# Patient Record
Sex: MALE | Race: WHITE | Employment: FULL TIME | URBAN - METROPOLITAN AREA
[De-identification: names, ages, dates, MRNs, and addresses within clinical notes are randomized per-mention and may not be internally consistent; named-entity substitution may affect disease eponyms.]

---

## 2022-04-08 ENCOUNTER — APPOINTMENT (OUTPATIENT)
Dept: CT IMAGING | Age: 57
End: 2022-04-08
Payer: COMMERCIAL

## 2022-04-08 ENCOUNTER — HOSPITAL ENCOUNTER (EMERGENCY)
Age: 57
Discharge: HOME | End: 2022-04-09
Attending: EMERGENCY MEDICINE
Payer: COMMERCIAL

## 2022-04-08 ENCOUNTER — APPOINTMENT (OUTPATIENT)
Dept: GENERAL RADIOLOGY | Age: 57
End: 2022-04-08
Payer: COMMERCIAL

## 2022-04-08 VITALS
SYSTOLIC BLOOD PRESSURE: 135 MMHG | DIASTOLIC BLOOD PRESSURE: 104 MMHG | RESPIRATION RATE: 24 BRPM | TEMPERATURE: 98.9 F | OXYGEN SATURATION: 94 % | HEART RATE: 75 BPM

## 2022-04-08 DIAGNOSIS — S02.0XXA CLOSED FRACTURE OF FRONTAL BONE, INITIAL ENCOUNTER (HCC): Primary | ICD-10-CM

## 2022-04-08 DIAGNOSIS — S52.121A DISPLACED FRACTURE OF HEAD OF RIGHT RADIUS, INITIAL ENCOUNTER FOR CLOSED FRACTURE: ICD-10-CM

## 2022-04-08 PROBLEM — S53.004A: Status: ACTIVE | Noted: 2022-04-08

## 2022-04-08 PROBLEM — S02.129A ORBITAL ROOF FRACTURE (HCC): Status: ACTIVE | Noted: 2022-04-08

## 2022-04-08 PROCEDURE — 73070 X-RAY EXAM OF ELBOW: CPT

## 2022-04-08 PROCEDURE — 73590 X-RAY EXAM OF LOWER LEG: CPT

## 2022-04-08 PROCEDURE — 12011 RPR F/E/E/N/L/M 2.5 CM/<: CPT

## 2022-04-08 PROCEDURE — 71045 X-RAY EXAM CHEST 1 VIEW: CPT

## 2022-04-08 PROCEDURE — 70486 CT MAXILLOFACIAL W/O DYE: CPT

## 2022-04-08 PROCEDURE — 6360000002 HC RX W HCPCS: Performed by: STUDENT IN AN ORGANIZED HEALTH CARE EDUCATION/TRAINING PROGRAM

## 2022-04-08 PROCEDURE — 72125 CT NECK SPINE W/O DYE: CPT

## 2022-04-08 PROCEDURE — 70450 CT HEAD/BRAIN W/O DYE: CPT

## 2022-04-08 PROCEDURE — 73562 X-RAY EXAM OF KNEE 3: CPT

## 2022-04-08 PROCEDURE — 99285 EMERGENCY DEPT VISIT HI MDM: CPT

## 2022-04-08 PROCEDURE — 90471 IMMUNIZATION ADMIN: CPT | Performed by: STUDENT IN AN ORGANIZED HEALTH CARE EDUCATION/TRAINING PROGRAM

## 2022-04-08 PROCEDURE — 90715 TDAP VACCINE 7 YRS/> IM: CPT | Performed by: STUDENT IN AN ORGANIZED HEALTH CARE EDUCATION/TRAINING PROGRAM

## 2022-04-08 PROCEDURE — 6370000000 HC RX 637 (ALT 250 FOR IP): Performed by: STUDENT IN AN ORGANIZED HEALTH CARE EDUCATION/TRAINING PROGRAM

## 2022-04-08 RX ORDER — ACETAMINOPHEN 500 MG
1000 TABLET ORAL ONCE
Status: COMPLETED | OUTPATIENT
Start: 2022-04-08 | End: 2022-04-08

## 2022-04-08 RX ADMIN — TETANUS TOXOID, REDUCED DIPHTHERIA TOXOID AND ACELLULAR PERTUSSIS VACCINE, ADSORBED 0.5 ML: 5; 2.5; 8; 8; 2.5 SUSPENSION INTRAMUSCULAR at 20:26

## 2022-04-08 RX ADMIN — ACETAMINOPHEN 1000 MG: 500 TABLET ORAL at 20:26

## 2022-04-08 ASSESSMENT — PAIN SCALES - GENERAL: PAINLEVEL_OUTOF10: 5

## 2022-04-08 NOTE — Clinical Note
Seven Childers was seen and treated in our emergency department on 4/8/2022. He may return to work on 04/18/2022. If you have any questions or concerns, please don't hesitate to call.       Claudette Nye MD

## 2022-04-09 RX ORDER — CEPHALEXIN 500 MG/1
500 CAPSULE ORAL 2 TIMES DAILY
Qty: 14 CAPSULE | Refills: 0 | Status: SHIPPED | OUTPATIENT
Start: 2022-04-09 | End: 2022-04-16

## 2022-04-09 ASSESSMENT — ENCOUNTER SYMPTOMS
SINUS PAIN: 0
SORE THROAT: 0
NAUSEA: 0
DIARRHEA: 0
COUGH: 0
VOMITING: 0
SHORTNESS OF BREATH: 0
ABDOMINAL PAIN: 0
EYE PAIN: 0

## 2022-04-09 NOTE — ED NOTES
Pt switched from c-collar from EMS to [place] collar for comfort.       Breana Sheehan RN  04/08/22 [id number]

## 2022-04-09 NOTE — ED PROVIDER NOTES
[facility] ED  Emergency Department Encounter  EmergencyMedicineResident     This patient was seen during the COVID-19 crisis. There were limited resources and those resources we did have had to be conserved for the sickest of patients. Pt Name: Allen Ames  MRN: [id number]  [place] 1965  Date of evaluation: 4/9/22  PCP: No primary care provider on file. CHIEF COMPLAINT       Chief Complaint   Patient presents with    Fall       HISTORY OF PRESENT ILLNESS  (Location/Symptom, Timing/Onset, Context/Setting, Quality, Duration, Modifying Factors, Severity.)      Allen Ames is a 56 y.o. male who presents for evaluation of fall down a flight of steps at home. Reportedly patient was carrying a cabinet down the steps when he fell forward. He had trauma to the face right elbow and the left knee. He did not lose consciousness. He did feel dazed. Patient denies any neurologic deficits. He notes that he has limited range of motion at the right elbow. He reports minimal comorbidities. He came to emergency department immediately injury. Denies any visual changes or blurred vision. Does wear glasses. Denies any AC or EP use. PAST MEDICAL / SURGICAL /SOCIAL / FAMILY HISTORY      has no past medical history on file. has no past surgical history on file.       Social History     Socioeconomic History    Marital status:      Spouse name: Not on file    Number of children: Not on file    Years of education: Not on file    Highest education level: Not on file   Occupational History    Not on file   Tobacco Use    Smoking status: Not on file    Smokeless tobacco: Not on file   Substance and Sexual Activity    Alcohol use: Not on file    Drug use: Not on file    Sexual activity: Not on file   Other Topics Concern    Not on file   Social History Narrative    Not on file     Social Determinants of Health     Financial Resource Strain:     Difficulty of Paying Living Expenses: Not on file   Food Insecurity:     Worried About [address] [employer] in the Last Year: Not on file    Warner of Food in the Last Year: Not on file   Transportation Needs:     Lack of Transportation (Medical): Not on file    Lack of Transportation (Non-Medical): Not on file   Physical Activity:     Days of Exercise per Week: Not on file    Minutes of Exercise per Session: Not on file   Stress:     Feeling of Stress : Not on file   Social Connections:     Frequency of Communication with Friends and Family: Not on file    Frequency of Social Gatherings with Friends and Family: Not on file    Attends Catholic Services: Not on file    Active Member of [address] [place] [address] or Organizations: Not on file    Attends Club or Organization Meetings: Not on file    Marital Status: Not on file   Intimate Partner Violence:     Fear of Current or Ex-Partner: Not on file    Emotionally Abused: Not on file    Physically Abused: Not on file    Sexually Abused: Not on file   Housing Stability:     Unable to Pay for Housing in the Last Year: Not on file    Number of [place] in the Last Year: Not on file    Unstable Housing in the Last Year: Not on file       History reviewed. No pertinent family history. Allergies:  Patient has no known allergies. Home Medications:  Prior to Admission medications    Medication Sig Start Date End Date Taking? Authorizing Provider   cephALEXin (KEFLEX) 500 MG capsule Take 1 capsule by mouth 2 times daily for 7 days 4/9/22 4/16/22 Yes Jimi Davis MD       REVIEW OF SYSTEMS    (2-9 systems for level 4, 10 or more forlevel 5)      Review of Systems   Constitutional: Negative for activity change, chills and fever. HENT: Negative for congestion, sinus pain and sore throat. Eyes: Negative for pain and visual disturbance. Respiratory: Negative for cough and shortness of breath. Cardiovascular: Negative for chest pain.    Gastrointestinal: Negative for abdominal pain, diarrhea, nausea and vomiting. Genitourinary: Negative for difficulty urinating, dysuria and hematuria. Musculoskeletal:        Right ankle pain, left knee abrasion   Skin: Positive for wound. Neurological: Positive for headaches. Negative for dizziness and light-headedness. Psychiatric/Behavioral: Negative for agitation and confusion. PHYSICAL EXAM   (up to 7 for level 4, 8 or more forlevel 5)      ED TRIAGE VITALS BP: (!) 135/104, Temp: 98.9 °F (37.2 °C), Pulse: 75, Resp: 24, SpO2: 94 %    Vitals:    04/08/22 1950 04/08/22 1957   BP: (!) 135/104    Pulse: 75    Resp: 24    Temp: 98.9 °F (37.2 °C)    TempSrc: Oral    SpO2:  94%         Physical Exam  Constitutional:       General: He is not in acute distress. Appearance: He is ill-appearing. HENT:      Head:      Comments: Marked ecchymosis to both eyes, multiple abrasions over the right side of the forehead and face, jagged laceration over the right eyebrow with active bleeding     Right Ear: Tympanic membrane normal.      Left Ear: Tympanic membrane normal.      Nose: Nose normal. No congestion. Mouth/Throat:      Mouth: Mucous membranes are dry. Comments: No missing teeth, no tongue laceration, chipped lower tooth  Eyes:      Extraocular Movements: Extraocular movements intact. Pupils: Pupils are equal, round, and reactive to light. Cardiovascular:      Rate and Rhythm: Normal rate and regular rhythm. Pulmonary:      Effort: Pulmonary effort is normal. No respiratory distress. Breath sounds: No stridor. No wheezing or rhonchi. Abdominal:      Tenderness: There is no abdominal tenderness. There is no guarding. Musculoskeletal:         General: No swelling or tenderness. Cervical back: Normal range of motion. No rigidity or tenderness. Comments: Tenderness palpation over the radial aspect of the elbow, limited range of motion secondary to pain, distal pulses intact. Abrasion over the left knee. Abrasion to the volar aspect of the left wrist.  No snuffbox tenderness or pain at the wrist.   Skin:     General: Skin is warm and dry. Capillary Refill: Capillary refill takes less than 2 seconds. Comments: Left knee abrasion   Neurological:      General: No focal deficit present. Mental Status: He is alert and oriented to person, place, and time. Cranial Nerves: No cranial nerve deficit. Sensory: No sensory deficit. Motor: No weakness. Coordination: Coordination normal.           DIFFERENTIAL  DIAGNOSIS     PLAN (LABS / IMAGING / EKG):  Orders Placed This Encounter   Procedures    CT HEAD WO CONTRAST    CT CERVICAL SPINE WO CONTRAST    XR CHEST (SINGLE VIEW FRONTAL)    XR ELBOW RIGHT (2 VIEWS)    XR KNEE LEFT (3 VIEWS)    XR TIBIA FIBULA LEFT (2 VIEWS)    CT FACIAL BONES WO CONTRAST    Vitamin D 25 Hydroxy    Inpatient consult to Trauma Surgery    Inpatient consult to Orthopedic Surgery       MEDICATIONS ORDERED:  ED Medication Orders (From admission, onward)    Start Ordered     Status Ordering Provider    04/08/22 2015 04/08/22 2001  Tetanus-Diphth-Acell Pertussis (BOOSTRIX) injection 0.5 mL  ONCE         Last MAR action: Given - by Sujata Fox on 04/08/22 at 2026 Mary VAUGHAN    04/08/22 2015 04/08/22 2001  acetaminophen (TYLENOL) tablet 1,000 mg  ONCE         Last MAR action: Given - by Sujata Fox on 04/08/22 at 2026 JAVI XIONG            DIAGNOSTIC RESULTS / EMERGENCY DEPARTMENT COURSE / MDM     IMPRESSION & INITIAL PLAN:  37-year-old male presenting respiratory for evaluation of a fall down steps carrying a cabinet. He is got marked abrasions over the right side of the face with a laceration over the right eyebrow. Additionally has tenderness over the right elbow with limited range of motion secondary to pain. Patient also has an abrasion over the left knee.   Imaging of all the extremities were obtained, there was a radial head fracture present at the level of the right elbow. It was mildly displaced. Distal pulses and neurovascular sensation are intact. CT head was obtained showing a right frontal skull fracture with extension into the orbital rim. Plastic surgery consult on the case recommending outpatient follow-up and Keflex coverage. Trauma surgery evaluate the patient for both body system involvement, they feel this patient is suitable for follow-up and discharged home as his wife is a nurse. Orthopedic surgery did reduce the fracture bedside he will be following up with Dr. Jane Hollins as an outpatient in 7 to 10 days. Patient did only request Tylenol here in the emergency department for pain control. He was updated with all results. The laceration of the right eyebrow was closed with Dermabond. Patient tolerated the procedure well. The margins were well approximated. There were no complications. LABS:  No results found for this visit on 04/08/22. RADIOLOGY:  CT FACIAL BONES WO CONTRAST   Final Result   Nondisplaced right frontal skull fracture extending through the orbital rim,   orbital roof and into medial orbital wall as described above. No significant   resulting hemorrhage and no pneumocephalus identified. XR CHEST (SINGLE VIEW FRONTAL)   Final Result   No acute airspace disease identified. No displaced fracture identified. XR ELBOW RIGHT (2 VIEWS)   Final Result   Mildly displaced radial head fracture. XR KNEE LEFT (3 VIEWS)   Final Result   No acute osseous abnormality identified in the left knee or lower leg. XR TIBIA FIBULA LEFT (2 VIEWS)   Final Result   No acute osseous abnormality identified in the left knee or lower leg. CT HEAD WO CONTRAST   Final Result   Head CT: Nondisplaced right orbital roof fracture which extends into the   medial orbital wall. Further evaluation with a maxillofacial CT is   recommended to fully characterize that.       Otherwise, no acute intracranial abnormality detected. Cervical spine CT: No acute fracture or traumatic malalignment. CT CERVICAL SPINE WO CONTRAST   Final Result   Head CT: Nondisplaced right orbital roof fracture which extends into the   medial orbital wall. Further evaluation with a maxillofacial CT is   recommended to fully characterize that. Otherwise, no acute intracranial abnormality detected. Cervical spine CT: No acute fracture or traumatic malalignment. CONSULTS:  IP CONSULT TO TRAUMA SURGERY  IP CONSULT TO ORTHOPEDIC SURGERY    CRITICAL CARE:  See attending physician note    FINAL IMPRESSION      1. Closed fracture of frontal bone, initial encounter ([facility] [address]    2. Displaced fracture of head of right radius, initial encounter for closed fracture          DISPOSITION / PLAN     DISPOSITION Decision To Discharge 04/08/2022 11:59:45 PM      PATIENT REFERRED TO:  [address]  [address] [facility]  [phone]  On 4/19/2022  Plastics clinic for orbital fracture    Grisel Craven MD  [address] [facility] [address]  [phone]    In 1 week  radial fx    Kyle Dobson MD  [address] [address]  [phone]    In 1 week        DISCHARGE MEDICATIONS:  Discharge Medication List as of 4/9/2022 12:01 AM      START taking these medications    Details   cephALEXin (KEFLEX) 500 MG capsule Take 1 capsule by mouth 2 times daily for 7 days, Disp-14 capsule, R-0Print           Discharge Medication List as of 4/9/2022 12:01 AM           Lalo Pizano MD  Emergency Medicine Resident    (Please note that portions of this note were completed with a voice recognition program.  Efforts were made to edit the dictations but occasionally words are mis-transcribed.)       Lalo Pizano MD  Resident  04/09/22 1333
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
## 2022-04-09 NOTE — CONSULTS
Orthopaedic Surgery Consult  (Dr. Noah Aguilera)      CC/Reason for consult:  Right radial head fracture    HPI:      The patient is a 56 y.o. male reports to [address] [place] [address] after a fall down 10 flight of stairs. Patient was moving a cabinet into the basement when he tripped over a box and fell down 10 stairs. States that he put his right arm out to brace his fall. He had immediate pain to the elbow and face. EMS was called and the patient was transported to [address] [place] [address]. X-rays in the emergency department of the right elbow demonstrated a radial head fracture. Orthopedics was consulted for further evaluation. At baseline patient states that he has no issues with the right arm. He denies any numbness or tingling to the right upper extremity. Patient was also found to have right orbital fractures. He denies any other pain or complaint at this time    Patient denies any medical history. He denies taking any medications including anticoagulation. He denies any orthopedic history including fracture. Past Medical History:    History reviewed. No pertinent past medical history. Past Surgical History:    History reviewed. No pertinent surgical history. Medications Prior to Admission:   Prior to Admission medications    Not on File     Allergies:    Patient has no known allergies.   Social History:   Social History     Socioeconomic History    Marital status:      Spouse name: None    Number of children: None    Years of education: None    Highest education level: None   Occupational History    None   Tobacco Use    Smoking status: None    Smokeless tobacco: None   Substance and Sexual Activity    Alcohol use: None    Drug use: None    Sexual activity: None   Other Topics Concern    None   Social History Narrative    None     Social Determinants of Health     Financial Resource Strain:     Difficulty of Paying Living Expenses: Not on file   Food Insecurity:     Worried About [facility] in the Last Year: Not on file    Warner of Food in the Last Year: Not on file   Transportation Needs:     Lack of Transportation (Medical): Not on file    Lack of Transportation (Non-Medical): Not on file   Physical Activity:     Days of Exercise per Week: Not on file    Minutes of Exercise per Session: Not on file   Stress:     Feeling of Stress : Not on file   Social Connections:     Frequency of Communication with Friends and Family: Not on file    Frequency of Social Gatherings with Friends and Family: Not on file    Attends Taoist Services: Not on file    Active Member of [address] [place] [address] or Organizations: Not on file    Attends Club or Organization Meetings: Not on file    Marital Status: Not on file   Intimate Partner Violence:     Fear of Current or Ex-Partner: Not on file    Emotionally Abused: Not on file    Physically Abused: Not on file    Sexually Abused: Not on file   Housing Stability:     Unable to Pay for Housing in the Last Year: Not on file    Number of [place] in the Last Year: Not on file    Unstable Housing in the Last Year: Not on file     Family History:  History reviewed. No pertinent family history. ROS:   Constitutional: Negative for fever and chills. Respiratory: Negative for cough. Cardiovascular: Negative for chest pain. Musculoskeletal: Positive for right elbow arthralgias, myalgias. Skin: Negative for itching and rash. Neurological: Negative for numbness, tingling, weakness. PE:  Blood pressure (!) 135/104, pulse 75, temperature 98.9 °F (37.2 °C), temperature source Oral, resp. rate 24, SpO2 94 %. Gen: Alert and oriented, NAD, cooperative. Intubated/sedated. Head: Normocephalic, abrasions to the right side of the face with a laceration over the right eyebrow. Ecchymosis bilateral orbits. .    Cardiovascular: Normal rate. Respiratory: Chest symmetric, no accessory muscle use.     Pelvis: Stable to anterior and lateral compression without pain. RUE: Skin intact. Mild effusion noted with tenderness palpation over the radial head. Non-tender to palpation at the wrist. No abrasion, deformity, or lacerations. Compartments soft and easily compressible. Full ROM at shoulder without pain. Elbow Passive range of motion of 70 degrees of pronation/supination, 10 to 100 degrees flexion/extension with pain at extremes of motion. No mechanical block appreciated. Ulnar/median/AIN/PIN/radial motor intact. Axillary/MCN/median/ulnar/radial nerves SILT. Radial pulse 2+ with BCR.    LUE: Skin intact. No ecchymoses, abrasion, deformity, or lacerations. Non tender to palpation. No crepitus. Compartments soft and easily compressible. Full ROM at shoulder without pain. Full ROM at elbow without pain. Ulnar/median/AIN/PIN/radial motor intact. Axillary/MCN/median/ulnar/radial nerves SILT. Radial pulse 2+ with BCR. RLE: Skin intact. No ecchymoses, abrasions, deformity, or lacerations. Non tender to palpation. No crepitus. Compartments soft and easily compressible. EHL/FHL/TA/GS complex motor intact. Sural/saphenous/SPN/DPN/plantar nerve distribution SILT. Patient has no groin pain with log roll maneuver. Lachman 1A, knee appears stable to varus and valgus stress test at 0 and 30 degrees. DP and PT pulses 2+ with BCR. LLE: Skin intact. Small abrasions to the anterior shin and proximal knee. Non tender to palpation. No crepitus. Compartments soft and easily compressible. EHL/FHL/TA/GS complex motor intact. Sural/saphenous/SPN/DPN/plantar nerve distribution SILT. Patient has no groin pain with log roll maneuver. Lachman 1A, knee appears stable to varus and valgus stress test at 0 and 30 degrees. DP and PT pulses 2+ with BCR. Labs:  No results for input(s): WBC, HGB, HCT, PLT, INR, PTT, NA, K, BUN, CREATININE, GLUCOSE, SEDRATE, CRP in the last 72 hours.     Invalid input(s): PT     Imaging:   X-rays of the right elbow demonstrating a minimally displaced fracture of the radial head. Assessment/Plan: 56 y.o. male who fell down a flight of stairs, being seen for:    -Right radial head fracture    -No acute orthopaedic intervention indicated  -Plan for immobilization of the right arm in a sling  -Nonweightbearing right upper extremity  -Diet: Okay for diet from orthopedic perspective  -DVT ppx: Okay for chemical AC from orthopedic perspective  -F/u VitD level  -Pain control per primary team  -Ice and elevate extremity for pain and swelling  -PT/OT: Evaluate and treat  -Dispo: Okay to discharge from orthopedic perspective  -Follow up outpatient with Dr. Dominic Baer in 7 to 10 days.  -Please contact ortho with any questions      Kristen Nicolas DO  Resident Physician, PGY-1   Orthopaedic Surgery  11:24 PM 4/8/2022    This note is created with the assistance of a speech recognition program. While intending to generate a document that actually reflects the content of the visit, the document can still have some errors including those of syntax and sound a like substitutions which may escape proof reading. In such instances, actual meaning can be extrapolated by contextual diversion.

## 2022-04-09 NOTE — ED NOTES
Pt to ED for neck pain, right eye pain, and right elbow pain after fall down 10-12 steps at home. Pt states he was carrying a cabinet down the steps and tripped hitting his head on the way down. Pt denies any LOC and denies any use of blood thinners including aspirin. Pt has abrasions to right forehead down to the maxilla on right side,1.5 inch laceration above right eyebrow, and scattered abrasions on left leg. Pt denies any pertinent medical hx. He is alert and oriented x4, VSS, in c-collar.         Henri Mcmahon RN  04/08/22 2266

## 2022-04-09 NOTE — ED PROVIDER NOTES
[facility]     Emergency Department     Faculty Attestation    I performed a history and physical examination of the patient and discussed management with the resident. I reviewed the residents note and agree with the documented findings including all diagnostic interpretations and plan of care. Any areas of disagreement are noted on the chart. I was personally present for the key portions of any procedures. I have documented in the chart those procedures where I was not present during the key portions. I have reviewed the emergency nurses triage note. I agree with the chief complaint, past medical history, past surgical history, allergies, medications, social and family history as documented unless otherwise noted below. Documentation of the HPI, Physical Exam and Medical Decision Making performed by scribedwige is based on my personal performance of the HPI, PE and MDM. For Physician Assistant/ Nurse Practitioner cases/documentation I have personally evaluated this patient and have completed at least one if not all key elements of the E/M (history, physical exam, and MDM). Additional findings are as noted. This patient was evaluated in the Emergency Department for symptoms described in the history of present illness. He/she was evaluated in the context of the global COVID-19 pandemic, which necessitated consideration that the patient might be at risk for infection with the SARS-CoV-2 virus that causes COVID-19. Institutional protocols and algorithms that pertain to the evaluation of patients at risk for COVID-19 are in a state of rapid change based on information released by regulatory bodies including the CDC and federal and state organizations. These policies and algorithms were followed during the patient's care in the ED. Primary Care Physician: No primary care provider on file. History:  This is a 56 y.o. male who presents to the Emergency Department with complaint of fall. Fall down 12 steps. Did strike his head and is complaining of neck pain. Did not lose consciousness. No blood thinner use. Complaining of pain to the right elbow as well as abrasion/laceration to the left knee/tibia. Physical:     oral temperature is 98.9 °F (37.2 °C). His blood pressure is 135/104 (abnormal) and his pulse is 75. His respiration is 24 and oxygen saturation is 94%.    56 y.o. male no acute distress, abrasion and superficial laceration to the right frontal scalp. Pupils equal and reactive extraocular motion is intact, no obvious raccoon eyes there is some early bruising in the superior medial aspect of the periorbital space. No melendez sign, no hemotympanum. C-collar in place. No tenderness palpation of midline thoracic and lumbar spine. Pelvis is stable. Some tenderness especially with extending the right elbow but no obvious deformity, the left lower extremity shows superficial laceration overlying the superior portion of the tibia approaching the knee. There is no obvious laxity on varus or valgus stress negative drawer and Lachman. Impression: Fall    Plan: CT head neck x-rays analgesia update tetanus    MIPS 415    The patient has one or more of the following conditions that are excluded from the measure (select all that apply) :  Patient has a ventricular shunt: No  Patient has a brain tumor: No  Patient has multi-system trauma: Yes  Patient is pregnant: No  Patient is taking an antiplatelet medication (excluding aspirin): No  Patient is 72years old or older: No  CT scan ordered for reasons other than trauma: No  A head CT was ordered, but not by an emergency care provider: No    Patient is 25 or older, presenting with minor blunt head trauma.  Head CT (including cosigned orders) was ordered by an emergency care clinician for trauma because (select one or more): [Satisfies MIPS]    Patients GCS was less than 15: No  Focal neurological deficit: No  Severe Headache: No  Vomiting: No  Physical signs of a basilar skull fracture: No  Coagulopathy: No  Thrombocytopenia: No  Patient suspected of taking an anticoagulant medication: No  Severe or Dangerous mechanism of injury (Select one or more):    MVA with patient ejection, death of another passenger, rollover, speed > 40mph, airbag deployment,  or passenger on ATV or motorcycle: No   Pedestrian or bicyclist without helmet: struck by motorized vehicle, in bicycle crash: No  Fall > 3 feet or 5 stairs: Yes  Head struck by high-impact object (hammer, baseball, baseball bat, heavy object such as falling brick): No  Other: [*] (ie. assault description): No          Lauro Zayas MD, Bryant Flores  Attending Emergency Physician         Ricci Garcia MD  04/08/22 1104

## 2022-04-09 NOTE — FLOWSHEET NOTE
[facility]     Emergency/Trauma Note    PATIENT NAME: Mariola Coles    Shift date: 4/08/2022  Shift day: Friday   Shift # 3    Room # 18/18   Name: Mariola Coles            Age: 56 y.o. Gender: male          Temple: None   Place of [place]az[place]: Unknown    Trauma/Incident type: Adult Trauma Consult  Admit Date & Time: 4/8/2022  7:50 PM  TRAUMA NAME: N/A    ADVANCE DIRECTIVES IN CHART? No    NAME OF DECISION MAKER: Per next of kin hierarchy, Karen Kendal, is patient's primary decision maker, unless otherwise specified. RELATIONSHIP OF DECISION MAKER TO PATIENT: Patient's Spouse    PATIENT/EVENT DESCRIPTION:  Mariola Coles is a 56 y.o. male who arrived to [facility] and was paged out as an \"Adult Trauma Consult,\" due to \"falling down basement stairs. \" Patient was sitting up in hospital bed, with spouse at bedside, when  visited. Pt to be admitted to 18/18. SPIRITUAL ASSESSMENT/INTERVENTION:   responded to page and gathered patient information outside room.  introduced herself to patient and to his spouse in room. Patient was coping well and in good spirits during visit.  shared words of hopefulness and gratitude that patient's injuries were not critical. Patient and spouse thanked  for support and hospitality. PATIENT BELONGINGS:  No belongings noted    ANY BELONGINGS OF SIGNIFICANT VALUE NOTED:  N/A    REGISTRATION STAFF NOTIFIED? Yes      WHAT IS YOUR SPIRITUAL CARE PLAN FOR THIS PATIENT?:  [facility] can make follow-up visit, per request. Roger Carver can be reached 24/7 via [employer]      04/08/22 7865   Encounter Summary   Services provided to: Patient and family together   Referral/Consult From: Multi-disciplinary team  (Adult Trauma Consult)   Support System Spouse   Continue Visiting   (4/08/2022)   Complexity of Encounter Moderate   Length of Encounter 30 minutes   Spiritual Assessment Completed Yes   Routine   Type Initial   Crisis   Type

## 2022-04-09 NOTE — PROGRESS NOTES
Final Result   No acute osseous abnormality identified in the left knee or lower leg. CT HEAD WO CONTRAST   Final Result   Head CT: Nondisplaced right orbital roof fracture which extends into the   medial orbital wall. Further evaluation with a maxillofacial CT is   recommended to fully characterize that. Otherwise, no acute intracranial abnormality detected. Cervical spine CT: No acute fracture or traumatic malalignment. CT CERVICAL SPINE WO CONTRAST   Final Result   Head CT: Nondisplaced right orbital roof fracture which extends into the   medial orbital wall. Further evaluation with a maxillofacial CT is   recommended to fully characterize that. Otherwise, no acute intracranial abnormality detected. Cervical spine CT: No acute fracture or traumatic malalignment. LABS:  Labs Reviewed   VITAMIN D 25 HYDROXY           PLAN/ TASKS OUTSTANDING     Patient seen and evaluated by trauma, orthopedics in consultation with plastic and cleared for discharge. Patient agreeable    (Please note that portions of this note were completed with a voice recognition program.  Efforts were made to edit the dictations but occasionally words are mis-transcribed. )    Fabricio Modi MD, MD,   Attending Emergency Physician

## 2022-04-19 ENCOUNTER — OFFICE VISIT (OUTPATIENT)
Dept: ORTHOPEDIC SURGERY | Age: 57
End: 2022-04-19
Payer: COMMERCIAL

## 2022-04-19 VITALS — HEIGHT: 70 IN | BODY MASS INDEX: 27.2 KG/M2 | WEIGHT: 190 LBS

## 2022-04-19 DIAGNOSIS — S52.124A CLOSED NONDISPLACED FRACTURE OF HEAD OF RIGHT RADIUS, INITIAL ENCOUNTER: Primary | ICD-10-CM

## 2022-04-19 PROCEDURE — 99202 OFFICE O/P NEW SF 15 MIN: CPT | Performed by: ORTHOPAEDIC SURGERY

## 2022-04-19 PROCEDURE — 24650 CLTX RDL HEAD/NCK FX WO MNPJ: CPT | Performed by: ORTHOPAEDIC SURGERY

## 2022-04-19 NOTE — PROGRESS NOTES
This 57-year-old patient is seen here for an injury sustained to his right wrist on 4/8/2022. The patient fell from 12 steps. He struck his head as well and has sustained right sided frontal skull fracture extending into the orbital roof but apparently was not going to need surgery. Patient also injured his elbow. He is persisted left radial head fracture and was given a splint and referred here. The splint was removed and he has actually excellent flexion and slight lack of full extension and good rotation with over the rotation is painful. Distally neurovascular structures are normal.    X-rays: I reviewed the x-rays of the elbow which show a nondisplaced fracture of the radial head. X-rays were reviewed with him as well. Diagnosis: Nondisplaced closed fracture right radial head. Treatment: I have advised given mobilization of the elbow and the wrist.  He will use the sling at night so that he could have a comfortable sleep. I will see him again in 1 week. Next visit should have 3 views of the right elbow. The patient is a very avid golfer and I told him that he might be able to start golfing at about 4 weeks.

## 2022-04-25 DIAGNOSIS — S53.004A RADIAL HEAD DISLOCATION, RIGHT, INITIAL ENCOUNTER: Primary | ICD-10-CM

## 2022-04-26 ENCOUNTER — OFFICE VISIT (OUTPATIENT)
Dept: ORTHOPEDIC SURGERY | Age: 57
End: 2022-04-26

## 2022-04-26 VITALS — BODY MASS INDEX: 27.2 KG/M2 | WEIGHT: 190 LBS | HEIGHT: 70 IN

## 2022-04-26 DIAGNOSIS — S53.004A RADIAL HEAD DISLOCATION, RIGHT, INITIAL ENCOUNTER: Primary | ICD-10-CM

## 2022-04-26 PROCEDURE — 99024 POSTOP FOLLOW-UP VISIT: CPT | Performed by: ORTHOPAEDIC SURGERY

## 2022-04-26 NOTE — PROGRESS NOTES
Chief Complaint   Patient presents with    Follow-up     rt radial head fracture 04/08/2022   This patient for sustained a minimally nondisplaced fracture right radial head on 4/8/2022 is seen here in follow-up. Patient says he does have occasional pain at about 2 out of 10. Examination: He definitely has some pain on full supination but not on pronation. His forearm rotation is the same as the other side. He has full flexion. Extension is lacking by about 5 degrees. X-rays: Further x-rays show that the radial head fracture may have slightly displaced. However still it is less than one third the diameter. Diagnosis: Minimally displaced fracture right radial head. Treatment: I have no objection him going to for chiropractic treatment for his back. Continue mobilization and also send him for physical therapy and will see him again in 2 weeks time. Next visit to have 3 views of the left elbow.

## 2022-05-09 DIAGNOSIS — S52.124A CLOSED NONDISPLACED FRACTURE OF HEAD OF RIGHT RADIUS, INITIAL ENCOUNTER: Primary | ICD-10-CM

## 2022-05-10 ENCOUNTER — OFFICE VISIT (OUTPATIENT)
Dept: ORTHOPEDIC SURGERY | Age: 57
End: 2022-05-10

## 2022-05-10 VITALS — BODY MASS INDEX: 27.2 KG/M2 | WEIGHT: 190 LBS | HEIGHT: 70 IN

## 2022-05-10 DIAGNOSIS — S52.121D CLOSED DISPLACED FRACTURE OF HEAD OF RIGHT RADIUS WITH ROUTINE HEALING, SUBSEQUENT ENCOUNTER: Primary | ICD-10-CM

## 2022-05-10 PROBLEM — S52.123D CLOSED DISPLACED FRACTURE OF HEAD OF RADIUS WITH ROUTINE HEALING: Status: ACTIVE | Noted: 2022-05-10

## 2022-05-10 PROBLEM — S53.004A: Status: RESOLVED | Noted: 2022-04-08 | Resolved: 2022-05-10

## 2022-05-10 PROCEDURE — 99024 POSTOP FOLLOW-UP VISIT: CPT | Performed by: ORTHOPAEDIC SURGERY

## 2022-05-10 NOTE — PROGRESS NOTES
Chief Complaint   Patient presents with    Follow-up     RT RADIAL HEAD FX : 04/08/22   This patient who sustained a fracture radial head on the right side on 4/8/2022 is seen here in follow-up. The patient has been attending physical therapy. He still has some pain at the extremes of motion. Examination: He lacks about 10 degrees of extension. Full flexion is possible but he did not have pain at the terminal range. His pronation supination's are excellent but again he has some pain on full supination. X-rays: Further x-rays taken today show no change in the position of the small particular fragment. Proximally the fracture line is still visible. Diagnosis: Minimally displaced fracture right radial head. Treatment: Advised to continue with the physical.  Again discussed with him that he is not able to get full extension. .    See him in 2 weeks and have further 3 views of the right elbow.

## 2022-05-20 DIAGNOSIS — S52.121D CLOSED DISPLACED FRACTURE OF HEAD OF RIGHT RADIUS WITH ROUTINE HEALING, SUBSEQUENT ENCOUNTER: Primary | ICD-10-CM

## 2022-05-24 ENCOUNTER — OFFICE VISIT (OUTPATIENT)
Dept: ORTHOPEDIC SURGERY | Age: 57
End: 2022-05-24

## 2022-05-24 VITALS — WEIGHT: 190 LBS | HEIGHT: 70 IN | BODY MASS INDEX: 27.2 KG/M2

## 2022-05-24 DIAGNOSIS — S52.121D CLOSED DISPLACED FRACTURE OF HEAD OF RIGHT RADIUS WITH ROUTINE HEALING, SUBSEQUENT ENCOUNTER: Primary | ICD-10-CM

## 2022-05-24 PROCEDURE — 99024 POSTOP FOLLOW-UP VISIT: CPT | Performed by: ORTHOPAEDIC SURGERY

## 2022-05-24 NOTE — PROGRESS NOTES
Chief Complaint   Patient presents with    Follow-up     RT RADIUS Fx : 04/08/2022   Patient for sustained a fracture here in the right radius on 4/8/2022 is seen here in follow-up. He has minimal pain. Examination shows he has  extension lag of about 10 degrees. Rotation is fine. Further x-rays were reviewed and show the fracture appears to be healing satisfactorily. Diagnosis: Fracture radial head right elbow. Treatment: He may continue mobilization and his physical therapy and may return to golfing and will see him again in 3 weeks. Next visit clinical assessment only.

## 2022-06-13 ENCOUNTER — OFFICE VISIT (OUTPATIENT)
Dept: ORTHOPEDIC SURGERY | Age: 57
End: 2022-06-13

## 2022-06-13 VITALS — WEIGHT: 190 LBS | HEIGHT: 70 IN | BODY MASS INDEX: 27.2 KG/M2

## 2022-06-13 DIAGNOSIS — S52.121D CLOSED DISPLACED FRACTURE OF HEAD OF RIGHT RADIUS WITH ROUTINE HEALING, SUBSEQUENT ENCOUNTER: Primary | ICD-10-CM

## 2022-06-13 PROCEDURE — 99024 POSTOP FOLLOW-UP VISIT: CPT | Performed by: ORTHOPAEDIC SURGERY

## 2022-06-13 NOTE — PROGRESS NOTES
Patient who sustained a slightly displaced partial right radial head fracture on the 4/8/2022 is seen here in follow-up. Patient is continue to have physical therapy. The patient says he does not have much pain but does have stiffness. He did try playing golf and was not doing very well. Examination: He has almost full flexion lacking less than 5 degrees. His extension is lacking also by about 5 degrees. The other side also has got 5 degrees of flexion contracture. Is forearm rotation is normal.    Diagnosis: Fracture radial head right side. Treatment: Continue with the physical therapy. He may continue to try to play golf and return here in 4 weeks. Next visit 3 views of the right elbow.

## 2022-07-07 DIAGNOSIS — S52.121D CLOSED DISPLACED FRACTURE OF HEAD OF RIGHT RADIUS WITH ROUTINE HEALING, SUBSEQUENT ENCOUNTER: Primary | ICD-10-CM
